# Patient Record
Sex: MALE | Race: WHITE | HISPANIC OR LATINO | URBAN - METROPOLITAN AREA
[De-identification: names, ages, dates, MRNs, and addresses within clinical notes are randomized per-mention and may not be internally consistent; named-entity substitution may affect disease eponyms.]

---

## 2017-08-21 ENCOUNTER — IMPORTED ENCOUNTER (OUTPATIENT)
Dept: URBAN - METROPOLITAN AREA CLINIC 31 | Facility: CLINIC | Age: 62
End: 2017-08-21

## 2017-08-21 PROCEDURE — 99214 OFFICE O/P EST MOD 30 MIN: CPT

## 2017-08-21 PROCEDURE — 92250 FUNDUS PHOTOGRAPHY W/I&R: CPT

## 2017-08-21 NOTE — PATIENT DISCUSSION
1.  New PVD OS: Patient was cautioned to call our office immediately if they experience a substantial change in their symptoms such as an increase in floaters persistent flashes loss of visual field (may appear as a shadow or a curtain) or decrease in visual acuity as these may indicate a retinal tear or detachment. If this is a new problem patient will need to return for re-examination  as determined by the physician. Recheck in 3 weeks.   2.  RTN  3 weeks DFTA OS

## 2017-08-22 PROBLEM — H43.812: Noted: 2017-08-22

## 2017-09-28 ENCOUNTER — IMPORTED ENCOUNTER (OUTPATIENT)
Dept: URBAN - METROPOLITAN AREA CLINIC 31 | Facility: CLINIC | Age: 62
End: 2017-09-28

## 2017-09-28 PROBLEM — H43.812: Noted: 2017-09-28

## 2017-09-28 PROCEDURE — 99214 OFFICE O/P EST MOD 30 MIN: CPT

## 2017-09-28 NOTE — PATIENT DISCUSSION
1.  PVD OS:   Getting better. Expl floaters will disappear. Patient was cautioned to call our office immediately if they experience a substantial change in their symptoms such as an increase in floaters persistent flashes loss of visual field 2.   RTN  11/17 CE

## 2017-12-06 ENCOUNTER — IMPORTED ENCOUNTER (OUTPATIENT)
Dept: URBAN - METROPOLITAN AREA CLINIC 31 | Facility: CLINIC | Age: 62
End: 2017-12-06

## 2017-12-06 PROBLEM — H02.831: Noted: 2017-12-06

## 2017-12-06 PROBLEM — Z79.899: Noted: 2017-12-06

## 2017-12-06 PROBLEM — H02.834: Noted: 2017-12-06

## 2017-12-06 PROBLEM — H52.4: Noted: 2017-12-06

## 2017-12-06 PROBLEM — H04.213: Noted: 2017-12-06

## 2017-12-06 PROCEDURE — 92015 DETERMINE REFRACTIVE STATE: CPT

## 2017-12-06 PROCEDURE — 92014 COMPRE OPH EXAM EST PT 1/>: CPT

## 2017-12-06 NOTE — PATIENT DISCUSSION
1.  Dermatochalasis OU:  Patient currently asymptomatic. Can eval with specialist 2. 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 The patient is complaining of excess lacrimation of both eyes. Because it is relatively mild and not constant no diagnostic or therapeutic intervention was recommended at this time. May be due to new Humira med. Will wait and see if gets better. 3. Minimal Rx change-  gave copy. 4.  Methotrexate - Pt currently DC Methotrexate therapy. Pt taking Humira. No signs of retinopathy based on todays dilated exam .  5.  Bacterial infection OS-  TDX tid 10 days then qd 5 days. Pt will call in 10 days and let me know if better. Clean lids.   6.  Return 1 yr CE  [facility]

## 2018-12-12 ENCOUNTER — IMPORTED ENCOUNTER (OUTPATIENT)
Dept: URBAN - METROPOLITAN AREA CLINIC 31 | Facility: CLINIC | Age: 63
End: 2018-12-12

## 2018-12-12 PROBLEM — H02.831: Noted: 2018-12-12

## 2018-12-12 PROBLEM — H52.4: Noted: 2018-12-12

## 2018-12-12 PROBLEM — Z79.899: Noted: 2018-12-12

## 2018-12-12 PROBLEM — H02.834: Noted: 2018-12-12

## 2018-12-12 PROBLEM — H04.213: Noted: 2018-12-12

## 2018-12-12 PROCEDURE — 92015 DETERMINE REFRACTIVE STATE: CPT

## 2018-12-12 PROCEDURE — 92014 COMPRE OPH EXAM EST PT 1/>: CPT

## 2018-12-12 NOTE — PATIENT DISCUSSION
1.  Dermatochalasis OU:  Patient currently asymptomatic. Can eval with specialist 2. 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 The patient is complaining of excess lacrimation of both eyes. Because it is relatively mild and not constant no diagnostic or therapeutic intervention was recommended at this time. May be due to new Humira med. Will wait and see if gets better. 3. Minimal Rx change-  gave copy. 4.  Methotrexate - Pt currently DC Methotrexate therapy. Pt taking Humira.   No signs of retinopathy based on todays dilated exam .  5.  Return 1 yr CE  P

## 2019-12-16 ENCOUNTER — IMPORTED ENCOUNTER (OUTPATIENT)
Dept: URBAN - METROPOLITAN AREA CLINIC 31 | Facility: CLINIC | Age: 64
End: 2019-12-16

## 2019-12-16 PROBLEM — H52.4: Noted: 2019-12-16

## 2019-12-16 PROBLEM — H02.834: Noted: 2019-12-16

## 2019-12-16 PROBLEM — Z79.899: Noted: 2019-12-16

## 2019-12-16 PROBLEM — H04.213: Noted: 2019-12-16

## 2019-12-16 PROBLEM — H02.831: Noted: 2019-12-16

## 2019-12-16 PROCEDURE — 92014 COMPRE OPH EXAM EST PT 1/>: CPT

## 2019-12-16 PROCEDURE — 92015 DETERMINE REFRACTIVE STATE: CPT

## 2019-12-16 NOTE — PATIENT DISCUSSION
1.  Dermatochalasis OU:  Patient currently asymptomatic. Disc with pt it can be causing excessive tearing. 2.  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 The patient is complaining of excess lacrimation of both eyes. Because it is relatively mild and not constant no diagnostic or therapeutic intervention was recommended at this time. May be due to new Humira med. 3.  Minimal Rx change-  gave copy. 4.  Methotrexate - Pt currently DC Methotrexate therapy. Pt taking Humira. No signs of retinopathy based on todays dilated exam .  5.  DRy Eyes-  start Retaine MGD qid ou and wash lids am and PM. 6.  RTN 1 mth OC--FU on tearing7.   Return 1 yr CE  [facility]

## 2020-06-26 ENCOUNTER — TELEPHONE ENCOUNTER (OUTPATIENT)
Dept: URBAN - METROPOLITAN AREA CLINIC 68 | Facility: CLINIC | Age: 65
End: 2020-06-26

## 2020-07-07 ENCOUNTER — OFFICE VISIT (OUTPATIENT)
Dept: URBAN - METROPOLITAN AREA CLINIC 68 | Facility: CLINIC | Age: 65
End: 2020-07-07

## 2020-07-07 ENCOUNTER — TELEPHONE ENCOUNTER (OUTPATIENT)
Dept: URBAN - METROPOLITAN AREA CLINIC 68 | Facility: CLINIC | Age: 65
End: 2020-07-07

## 2020-08-25 ENCOUNTER — OFFICE VISIT (OUTPATIENT)
Dept: URBAN - METROPOLITAN AREA CLINIC 68 | Facility: CLINIC | Age: 65
End: 2020-08-25

## 2020-08-26 ENCOUNTER — OFFICE VISIT (OUTPATIENT)
Dept: URBAN - METROPOLITAN AREA SURGERY CENTER 12 | Facility: SURGERY CENTER | Age: 65
End: 2020-08-26

## 2020-08-27 ENCOUNTER — OFFICE VISIT (OUTPATIENT)
Dept: URBAN - METROPOLITAN AREA SURGERY CENTER 12 | Facility: SURGERY CENTER | Age: 65
End: 2020-08-27

## 2020-08-28 ENCOUNTER — TELEPHONE ENCOUNTER (OUTPATIENT)
Dept: URBAN - METROPOLITAN AREA CLINIC 68 | Facility: CLINIC | Age: 65
End: 2020-08-28

## 2020-09-04 ENCOUNTER — OFFICE VISIT (OUTPATIENT)
Dept: URBAN - METROPOLITAN AREA CLINIC 68 | Facility: CLINIC | Age: 65
End: 2020-09-04

## 2020-09-09 ENCOUNTER — TELEPHONE ENCOUNTER (OUTPATIENT)
Dept: URBAN - METROPOLITAN AREA CLINIC 68 | Facility: CLINIC | Age: 65
End: 2020-09-09

## 2020-09-11 ENCOUNTER — TELEPHONE ENCOUNTER (OUTPATIENT)
Dept: URBAN - METROPOLITAN AREA CLINIC 68 | Facility: CLINIC | Age: 65
End: 2020-09-11

## 2020-09-11 ENCOUNTER — OFFICE VISIT (OUTPATIENT)
Dept: URBAN - METROPOLITAN AREA CLINIC 68 | Facility: CLINIC | Age: 65
End: 2020-09-11

## 2020-12-11 ENCOUNTER — TELEPHONE ENCOUNTER (OUTPATIENT)
Dept: URBAN - METROPOLITAN AREA CLINIC 68 | Facility: CLINIC | Age: 65
End: 2020-12-11

## 2020-12-17 ENCOUNTER — LAB OUTSIDE AN ENCOUNTER (OUTPATIENT)
Dept: URBAN - METROPOLITAN AREA CLINIC 68 | Facility: CLINIC | Age: 65
End: 2020-12-17

## 2020-12-18 ENCOUNTER — IMPORTED ENCOUNTER (OUTPATIENT)
Dept: URBAN - METROPOLITAN AREA CLINIC 31 | Facility: CLINIC | Age: 65
End: 2020-12-18

## 2020-12-18 PROBLEM — H02.831: Noted: 2020-12-18

## 2020-12-18 PROBLEM — H52.4: Noted: 2020-12-18

## 2020-12-18 PROBLEM — H02.834: Noted: 2020-12-18

## 2020-12-18 PROBLEM — H04.213: Noted: 2020-12-18

## 2020-12-18 PROBLEM — H35.372: Noted: 2020-12-18

## 2020-12-18 PROBLEM — Z79.899: Noted: 2020-12-18

## 2020-12-18 PROCEDURE — 92015 DETERMINE REFRACTIVE STATE: CPT

## 2020-12-18 PROCEDURE — 92133 CPTRZD OPH DX IMG PST SGM ON: CPT

## 2020-12-18 PROCEDURE — 92014 COMPRE OPH EXAM EST PT 1/>: CPT

## 2020-12-18 NOTE — PATIENT DISCUSSION
1.  Epiretinal Membrane OS -   OCT 12/18/20  271/273.  2.  PT c/o film over ou.  occurred overnight--  OD>OS---  No other physical finding for oD--Disc with pty and he will monior and if worsens he will call. 3.  Dermatochalasis OU:  Patient currently asymptomatic. Disc with pt it can be causing excessive tearing. 4.  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 The patient is complaining of excess lacrimation of both eyes. Because it is relatively mild and not constant no diagnostic or therapeutic intervention was recommended at this time. May be due to new Humira med. 3.  Minimal Rx change-  gave copy. 5.  Methotrexate - Pt currently DC Methotrexate therapy. Pt taking Humira. No signs of retinopathy based on todays dilated exam .  6.  DRy Eyes-  start Retaine MGD qid ou and wash lids am and PM. 7. MINIMal rx change8.   Return 1 yr CE  [facility]

## 2021-07-22 ENCOUNTER — TELEPHONE ENCOUNTER (OUTPATIENT)
Dept: URBAN - METROPOLITAN AREA CLINIC 68 | Facility: CLINIC | Age: 66
End: 2021-07-22

## 2022-04-01 ASSESSMENT — VISUAL ACUITY
OS_CC: 20/25-1
OS_CC: 20/25+3
OS_SC: 20/30
OD_CC: 20/20
OD_CC: 20/20
OD_SC: 20/30

## 2022-04-01 ASSESSMENT — TONOMETRY
OS_IOP_MMHG: 18
OD_IOP_MMHG: 15
OS_IOP_MMHG: 17
OS_IOP_MMHG: 16
OS_IOP_MMHG: 16
OD_IOP_MMHG: 17
OS_IOP_MMHG: 17
OD_IOP_MMHG: 16
OD_IOP_MMHG: 18
OS_IOP_MMHG: 16
OD_IOP_MMHG: 17

## 2022-06-04 ENCOUNTER — TELEPHONE ENCOUNTER (OUTPATIENT)
Dept: URBAN - METROPOLITAN AREA CLINIC 68 | Facility: CLINIC | Age: 67
End: 2022-06-04

## 2022-06-04 RX ORDER — METHOTREXATE 2.5 MG/1
METHOTREXATE( 2.5MG ORAL  DAILY ) INACTIVE -HX ENTRY TABLET ORAL DAILY
OUTPATIENT
Start: 2017-11-02

## 2022-06-04 RX ORDER — SODIUM SULFATE, POTASSIUM SULFATE, MAGNESIUM SULFATE 17.5; 3.13; 1.6 G/ML; G/ML; G/ML
SOLUTION, CONCENTRATE ORAL AS DIRECTED
Qty: 1 | Refills: 0 | OUTPATIENT
Start: 2020-07-07 | End: 2020-07-08

## 2022-06-05 ENCOUNTER — TELEPHONE ENCOUNTER (OUTPATIENT)
Dept: URBAN - METROPOLITAN AREA CLINIC 68 | Facility: CLINIC | Age: 67
End: 2022-06-05

## 2022-06-05 RX ORDER — ENALAPRIL MALEATE AND HYDROCHLOROTHIAZIDE 10; 25 MG/1; MG/1
ENALAPRIL-HYDROCHLOROTHIAZIDE( 10-25MG ORAL  DAILY ) ACTIVE -HX ENTRY TABLET ORAL DAILY
Status: ACTIVE | COMMUNITY
Start: 2020-09-11

## 2022-06-05 RX ORDER — ADALIMUMAB 10MG/0.2ML
HUMIRA( 10MG/0.2ML SUBCUTANEOUS  EVERY OTHER WEEK ) ACTIVE -HX ENTRY KIT SUBCUTANEOUS EVERY OTHER WEEK
Status: ACTIVE | COMMUNITY
Start: 2020-09-11

## 2022-06-05 RX ORDER — FOLIC ACID/VIT B COMPLEX AND C 0.8 MG
FOLIC ACID XTRA(  ORAL  DAILY ) ACTIVE -HX ENTRY TABLET ORAL DAILY
Status: ACTIVE | COMMUNITY
Start: 2020-09-11

## 2022-06-25 ENCOUNTER — TELEPHONE ENCOUNTER (OUTPATIENT)
Age: 67
End: 2022-06-25

## 2022-06-25 RX ORDER — SODIUM SULFATE, POTASSIUM SULFATE, MAGNESIUM SULFATE 17.5; 3.13; 1.6 G/ML; G/ML; G/ML
SOLUTION, CONCENTRATE ORAL AS DIRECTED
Qty: 1 | Refills: 0 | OUTPATIENT
Start: 2020-07-07 | End: 2020-07-08

## 2022-06-25 RX ORDER — PREDNISONE 10 MG/1
PREDNISONE(   5MG DAILY ) INACTIVE -HX ENTRY TABLET ORAL DAILY
OUTPATIENT
Start: 2017-11-02

## 2022-06-25 RX ORDER — METHOTREXATE 2.5 MG/1
METHOTREXATE( 2.5MG ORAL  DAILY ) INACTIVE -HX ENTRY TABLET ORAL DAILY
OUTPATIENT
Start: 2017-11-02

## 2022-06-26 ENCOUNTER — TELEPHONE ENCOUNTER (OUTPATIENT)
Age: 67
End: 2022-06-26

## 2022-06-26 RX ORDER — ENALAPRIL MALEATE AND HYDROCHLOROTHIAZIDE 10; 25 MG/1; MG/1
ENALAPRIL-HYDROCHLOROTHIAZIDE( 10-25MG ORAL  DAILY ) ACTIVE -HX ENTRY TABLET ORAL DAILY
Status: ACTIVE | COMMUNITY
Start: 2020-09-11

## 2024-03-19 ENCOUNTER — COMPREHENSIVE EXAM (OUTPATIENT)
Dept: URBAN - METROPOLITAN AREA CLINIC 34 | Facility: CLINIC | Age: 69
End: 2024-03-19

## 2024-03-19 DIAGNOSIS — M06.9: ICD-10-CM

## 2024-03-19 DIAGNOSIS — H52.4: ICD-10-CM

## 2024-03-19 DIAGNOSIS — H35.372: ICD-10-CM

## 2024-03-19 DIAGNOSIS — Z79.899: ICD-10-CM

## 2024-03-19 PROCEDURE — 92014 COMPRE OPH EXAM EST PT 1/>: CPT

## 2024-03-19 PROCEDURE — 92015 DETERMINE REFRACTIVE STATE: CPT

## 2024-03-19 PROCEDURE — 92134 CPTRZ OPH DX IMG PST SGM RTA: CPT

## 2024-03-19 ASSESSMENT — TONOMETRY
OS_IOP_MMHG: 17
OD_IOP_MMHG: 19

## 2024-03-19 ASSESSMENT — VISUAL ACUITY
OS_CC: 20/20
OD_CC: 20/20

## 2025-03-21 ENCOUNTER — COMPREHENSIVE EXAM (OUTPATIENT)
Age: 70
End: 2025-03-21

## 2025-03-21 DIAGNOSIS — M06.9: ICD-10-CM

## 2025-03-21 DIAGNOSIS — H04.123: ICD-10-CM

## 2025-03-21 DIAGNOSIS — H52.4: ICD-10-CM

## 2025-03-21 DIAGNOSIS — H35.372: ICD-10-CM

## 2025-03-21 DIAGNOSIS — Z79.899: ICD-10-CM

## 2025-03-21 PROCEDURE — 92250 FUNDUS PHOTOGRAPHY W/I&R: CPT

## 2025-03-21 PROCEDURE — 92014 COMPRE OPH EXAM EST PT 1/>: CPT

## 2025-03-21 PROCEDURE — 92015 DETERMINE REFRACTIVE STATE: CPT
